# Patient Record
Sex: FEMALE | Race: WHITE | Employment: OTHER | URBAN - NONMETROPOLITAN AREA
[De-identification: names, ages, dates, MRNs, and addresses within clinical notes are randomized per-mention and may not be internally consistent; named-entity substitution may affect disease eponyms.]

---

## 2018-02-27 ENCOUNTER — APPOINTMENT (OUTPATIENT)
Dept: ANESTHESIOLOGY | Facility: HOSPITAL | Age: 68
End: 2018-02-27
Attending: OPHTHALMOLOGY
Payer: COMMERCIAL

## 2018-02-27 PROCEDURE — 00142 ANES PX ON EYE LENS SURGERY: CPT | Mod: QZ | Performed by: NURSE ANESTHETIST, CERTIFIED REGISTERED

## 2018-03-08 ENCOUNTER — APPOINTMENT (OUTPATIENT)
Dept: ANESTHESIOLOGY | Facility: HOSPITAL | Age: 68
End: 2018-03-08
Attending: OPHTHALMOLOGY
Payer: COMMERCIAL

## 2018-03-08 PROCEDURE — 00142 ANES PX ON EYE LENS SURGERY: CPT | Mod: QZ | Performed by: NURSE ANESTHETIST, CERTIFIED REGISTERED

## 2019-12-10 ENCOUNTER — OFFICE VISIT (OUTPATIENT)
Dept: SURGERY | Facility: OTHER | Age: 69
End: 2019-12-10
Attending: SURGERY
Payer: COMMERCIAL

## 2019-12-10 ENCOUNTER — PREP FOR PROCEDURE (OUTPATIENT)
Dept: SURGERY | Facility: OTHER | Age: 69
End: 2019-12-10

## 2019-12-10 VITALS
BODY MASS INDEX: 41.37 KG/M2 | HEIGHT: 68 IN | WEIGHT: 273 LBS | DIASTOLIC BLOOD PRESSURE: 72 MMHG | SYSTOLIC BLOOD PRESSURE: 128 MMHG | TEMPERATURE: 99.7 F

## 2019-12-10 DIAGNOSIS — K43.2 INCISIONAL HERNIA, WITHOUT OBSTRUCTION OR GANGRENE: Primary | ICD-10-CM

## 2019-12-10 DIAGNOSIS — E66.01 MORBID OBESITY (H): ICD-10-CM

## 2019-12-10 PROCEDURE — 99204 OFFICE O/P NEW MOD 45 MIN: CPT | Performed by: SURGERY

## 2019-12-10 PROCEDURE — G0463 HOSPITAL OUTPT CLINIC VISIT: HCPCS

## 2019-12-10 ASSESSMENT — MIFFLIN-ST. JEOR: SCORE: 1811.82

## 2019-12-10 ASSESSMENT — PAIN SCALES - GENERAL: PAINLEVEL: NO PAIN (0)

## 2019-12-10 NOTE — PROGRESS NOTES
CLINIC NOTE - CONSULT  12/10/2019    Patient: Odalys Ingram  Referring Physician: No ref. provider found    Reason for Referral: Ventral Hernia    This is a 69 year old female with a vental hernia.   Previous abdominal surgery: YES   Previous repair of this hernia:YES   Reducible : YES   Symptomatic : YES   Pain at the hernia site : YES   Signs of obstruction    Nausea/VomitingNO    Diarrhea/ConstipationNO   Has Noticed it for several weeks      Past Medical History:  Past Medical History:   Diagnosis Date     Arthritis      Hypertension        Past Surgical History:  Past Surgical History:   Procedure Laterality Date     BREAST SURGERY      Mastectomy     BREAST SURGERY      Mastectomy     HERNIA REPAIR       HYSTERECTOMY         Family History History:  Family History   Problem Relation Age of Onset     Breast Cancer Mother      Hypertension Mother      Lipids Mother      Hypertension Father      Lipids Father      Heart Disease Maternal Grandmother         Disease     Breast Cancer Paternal Grandmother      Cancer Paternal Grandmother         Leukemia       History of Tobacco Use:  History   Smoking Status     Never Smoker   Smokeless Tobacco     Never Used       Current Medications:  Current Outpatient Medications   Medication Sig Dispense Refill     amitriptyline (ELAVIL) 50 MG tablet Take 50 mg by mouth At Bedtime. 1 tablet at night       ascorbic acid 500 MG TABS Take  by mouth. 1 tablet daily       aspirin 81 MG tablet Take 1 tablet by mouth daily. 1 tablet daily       cholecalciferol 1000 UNIT TABS Take  by mouth. Take 1 cap by mouth daily       doxycycline Monohydrate 50 MG CAPS Take  by mouth. 1 tablet daily       lisinopril-hydrochlorothiazide (PRINZIDE,ZESTORETIC) 10-12.5 MG per tablet Take 1 tablet by mouth daily. 1 tablet daily       metoprolol (LOPRESSOR) 50 MG tablet Take 50 mg by mouth 2 times daily. 1 tablet daily       omega-3 fatty acids 1200 MG capsule Take 1 capsule by mouth daily. 1 daily    "      Allergies:  Allergies   Allergen Reactions     Penicillins Rash       ROS:  Pertinent items are noted in HPI.  All other systems are negative.    PHYSICAL EXAM:     Vital signs: /72   Temp 99.7  F (37.6  C) (Tympanic)   Ht 1.727 m (5' 8\")   Wt 123.8 kg (273 lb)   BMI 41.51 kg/m     Weight: [unfilled]   BMI: Body mass index is 41.51 kg/m .   General: Normal, healthy, cooperative, in no acute distress, morbidly obese   Skin: no rashes, no ecchymoses, no petechiae   HEENT: PERRLA   Neck: supple   Lungs: clear to auscultation   CV: Regular rate and rhythm without murmer   Abdominal: Abdomen soft, non-tender. BS normal. No masses,  No organomegaly    Extremities: No cyanosis, clubbing or edema noted bilaterally in Upper and Lower Extremities   Neurological: without deficit      Hernia    Location: infraumbilical     Reducible: NO    Tender: mildly    Erythematous: NO      ASSESSMENT: 69 year old female with a recurrent ventral hernia located at the lower midline.  The hernia is symptomatic.  The hernia is incarcerated.  The hernia is not tender.    PLAN:  Discussed the options with the patient.  After discussion patients elects for surgical intervention and repair.  Will plan on a laparoscopic possible open recurrent ventral hernia repair.  The procedure was explained with its risk, benefits and alternatives.  Risks include but are not limited to recurrence, infections, damage to internal organs, need for additional procedures, reactions to anesthesia.  All of the patients questions were answered.  The patient consents to proceed with the plan above.  The procedure will be scheduled.    Pre-operative physical : YES      "

## 2019-12-10 NOTE — NURSING NOTE
"Chief Complaint   Patient presents with     Consult For     Possible Hernia Repair       Initial /72   Temp 99.7  F (37.6  C) (Tympanic)   Ht 1.727 m (5' 8\")   Wt 123.8 kg (273 lb)   BMI 41.51 kg/m   Estimated body mass index is 41.51 kg/m  as calculated from the following:    Height as of this encounter: 1.727 m (5' 8\").    Weight as of this encounter: 123.8 kg (273 lb).  Medication Reconciliation: complete  Shruti Perry LPN  "

## 2020-01-02 ENCOUNTER — TRANSFERRED RECORDS (OUTPATIENT)
Dept: HEALTH INFORMATION MANAGEMENT | Facility: HOSPITAL | Age: 70
End: 2020-01-02

## 2020-01-06 ENCOUNTER — ANESTHESIA EVENT (OUTPATIENT)
Dept: SURGERY | Facility: HOSPITAL | Age: 70
End: 2020-01-06
Payer: COMMERCIAL

## 2020-01-06 NOTE — ANESTHESIA PREPROCEDURE EVALUATION
Anesthesia Pre-Procedure Evaluation    Patient: Odalys Ingram   MRN: [id number] : 1950          Preoperative Diagnosis: Incisional hernia, without obstruction or gangrene [K43.2]    Procedure(s):  LAPAROSCOPIC POSSIBLE OPEN VENTRAL HERNIA REPAIR    Past Medical History:   Diagnosis Date     Arthritis      Hypertension      PONV (postoperative nausea and vomiting)      Past Surgical History:   Procedure Laterality Date     BREAST SURGERY      Mastectomy     BREAST SURGERY      Mastectomy     HERNIA REPAIR       HYSTERECTOMY         Anesthesia Evaluation     . Pt has had prior anesthetic. Type: General    History of anesthetic complications   - PONV        ROS/MED HX    ENT/Pulmonary:     (+)JENA risk factors hypertension, obese, , . .    Neurologic:  - neg neurologic ROS     Cardiovascular:     (+) hypertension-range: on beta blocker, ---. : . . . :. . Previous cardiac testing date:results:date: results:ECG reviewed date:20 results:SR with First degree AVB date: results:          METS/Exercise Tolerance:     Hematologic:     (+) Other Hematologic Disorder-thrombocytopenia      Musculoskeletal:   (+) arthritis,  other musculoskeletal- lumbar disc disorder, fibromyalgia      GI/Hepatic:     (+) GERD Other GI/Hepatic incisional hernia       Renal/Genitourinary:  - ROS Renal section negative       Endo:     (+) Obesity, .      Psychiatric:     (+) psychiatric history anxiety      Infectious Disease:         Malignancy:   (+) Malignancy History of Breast          Other:    - neg other ROS                      Physical Exam  Normal systems: cardiovascular, pulmonary and dental    Airway   Mallampati: II  TM distance: >3 FB  Neck ROM: full    Dental   (+) upper dentures    Cardiovascular   Rhythm and rate: regular and normal      Pulmonary    breath sounds clear to auscultation(+) decreased breath sounds               No results found for: WBC, HGB, HCT, PLT, CRP, SED, NA, POTASSIUM, CHLORIDE, CO2, BUN, CR, GLC,  "DANAY, PHOS, MAG, ALBUMIN, PROTTOTAL, ALT, AST, GGT, ALKPHOS, BILITOTAL, BILIDIRECT, LIPASE, AMYLASE, LORENZO, PTT, INR, FIBR, TSH, T4, T3, HCG, HCGS, CKTOTAL, CKMB, TROPN    Preop Vitals  BP Readings from Last 3 Encounters:   12/10/19 128/72   04/30/12 124/76    Pulse Readings from Last 3 Encounters:   04/30/12 84      Resp Readings from Last 3 Encounters:   No data found for Resp    SpO2 Readings from Last 3 Encounters:   No data found for SpO2      Temp Readings from Last 1 Encounters:   12/10/19 99.7  F (37.6  C) (Tympanic)    Ht Readings from Last 1 Encounters:   12/10/19 1.727 m (5' 8\")      Wt Readings from Last 1 Encounters:   12/10/19 123.8 kg (273 lb)    Estimated body mass index is 41.51 kg/m  as calculated from the following:    Height as of 12/10/19: 1.727 m (5' 8\").    Weight as of 12/10/19: 123.8 kg (273 lb).       Anesthesia Plan      History & Physical Review  History and physical reviewed and following examination; no interval change.    ASA Status:  3 .    NPO Status:  > 2 hours    Plan for General, ETT, Peripheral Nerve Block and For Post-op pain in coordination with surgeon with Intravenous and Propofol induction. Maintenance will be Inhalation.    PONV prophylaxis:  Ondansetron (or other 5HT-3) and Dexamethasone or Solumedrol  H and P date 1/2/20      Postoperative Care  Postoperative pain management:  IV analgesics.      Consents  Anesthetic plan, risks, benefits and alternatives discussed with:  Patient..                 Hai Snyder, APRN CRNA  "

## 2020-01-09 ENCOUNTER — HOSPITAL ENCOUNTER (OUTPATIENT)
Facility: HOSPITAL | Age: 70
Discharge: HOME | End: 2020-01-09
Attending: SURGERY | Admitting: SURGERY
Payer: COMMERCIAL

## 2020-01-09 ENCOUNTER — ANESTHESIA (OUTPATIENT)
Dept: SURGERY | Facility: HOSPITAL | Age: 70
End: 2020-01-09
Payer: COMMERCIAL

## 2020-01-09 VITALS
RESPIRATION RATE: 20 BRPM | DIASTOLIC BLOOD PRESSURE: 86 MMHG | HEART RATE: 77 BPM | OXYGEN SATURATION: 94 % | SYSTOLIC BLOOD PRESSURE: 144 MMHG | TEMPERATURE: 98.2 F

## 2020-01-09 DIAGNOSIS — K43.2 INCISIONAL HERNIA, WITHOUT OBSTRUCTION OR GANGRENE: ICD-10-CM

## 2020-01-09 PROCEDURE — 25000125 ZZHC RX 250: Performed by: NURSE ANESTHETIST, CERTIFIED REGISTERED

## 2020-01-09 PROCEDURE — 49652 AS LAP VENT/ABD HERNIA REPAIR: CPT | Performed by: NURSE ANESTHETIST, CERTIFIED REGISTERED

## 2020-01-09 PROCEDURE — 40000305 ZZH STATISTIC PRE PROC ASSESS I: Performed by: SURGERY

## 2020-01-09 PROCEDURE — 25000128 H RX IP 250 OP 636: Performed by: SURGERY

## 2020-01-09 PROCEDURE — 27110028 ZZH OR GENERAL SUPPLY NON-STERILE: Performed by: SURGERY

## 2020-01-09 PROCEDURE — 27210794 ZZH OR GENERAL SUPPLY STERILE: Performed by: SURGERY

## 2020-01-09 PROCEDURE — 37000008 ZZH ANESTHESIA TECHNICAL FEE, 1ST 30 MIN: Performed by: SURGERY

## 2020-01-09 PROCEDURE — 71000014 ZZH RECOVERY PHASE 1 LEVEL 2 FIRST HR: Performed by: SURGERY

## 2020-01-09 PROCEDURE — 36000056 ZZH SURGERY LEVEL 3 1ST 30 MIN: Performed by: SURGERY

## 2020-01-09 PROCEDURE — 25800030 ZZH RX IP 258 OP 636: Performed by: NURSE ANESTHETIST, CERTIFIED REGISTERED

## 2020-01-09 PROCEDURE — 36000058 ZZH SURGERY LEVEL 3 EA 15 ADDTL MIN: Performed by: SURGERY

## 2020-01-09 PROCEDURE — 37000009 ZZH ANESTHESIA TECHNICAL FEE, EACH ADDTL 15 MIN: Performed by: SURGERY

## 2020-01-09 PROCEDURE — 71000027 ZZH RECOVERY PHASE 2 EACH 15 MINS: Performed by: SURGERY

## 2020-01-09 PROCEDURE — 25000132 ZZH RX MED GY IP 250 OP 250 PS 637: Performed by: NURSE ANESTHETIST, CERTIFIED REGISTERED

## 2020-01-09 PROCEDURE — 25000128 H RX IP 250 OP 636: Performed by: NURSE ANESTHETIST, CERTIFIED REGISTERED

## 2020-01-09 PROCEDURE — 25000566 ZZH SEVOFLURANE, EA 15 MIN: Performed by: NURSE ANESTHETIST, CERTIFIED REGISTERED

## 2020-01-09 RX ORDER — METOPROLOL TARTRATE 50 MG
50 TABLET ORAL ONCE
Status: COMPLETED | OUTPATIENT
Start: 2020-01-09 | End: 2020-01-09

## 2020-01-09 RX ORDER — KETAMINE HYDROCHLORIDE 10 MG/ML
INJECTION INTRAMUSCULAR; INTRAVENOUS PRN
Status: DISCONTINUED | OUTPATIENT
Start: 2020-01-09 | End: 2020-01-09

## 2020-01-09 RX ORDER — FENTANYL CITRATE 50 UG/ML
25-50 INJECTION, SOLUTION INTRAMUSCULAR; INTRAVENOUS
Status: DISCONTINUED | OUTPATIENT
Start: 2020-01-09 | End: 2020-01-09 | Stop reason: HOSPADM

## 2020-01-09 RX ORDER — SCOLOPAMINE TRANSDERMAL SYSTEM 1 MG/1
1 PATCH, EXTENDED RELEASE TRANSDERMAL
Status: DISCONTINUED | OUTPATIENT
Start: 2020-01-09 | End: 2020-01-09 | Stop reason: HOSPADM

## 2020-01-09 RX ORDER — FENTANYL CITRATE 50 UG/ML
INJECTION, SOLUTION INTRAMUSCULAR; INTRAVENOUS PRN
Status: DISCONTINUED | OUTPATIENT
Start: 2020-01-09 | End: 2020-01-09

## 2020-01-09 RX ORDER — SODIUM CHLORIDE, SODIUM LACTATE, POTASSIUM CHLORIDE, CALCIUM CHLORIDE 600; 310; 30; 20 MG/100ML; MG/100ML; MG/100ML; MG/100ML
INJECTION, SOLUTION INTRAVENOUS CONTINUOUS PRN
Status: DISCONTINUED | OUTPATIENT
Start: 2020-01-09 | End: 2020-01-09

## 2020-01-09 RX ORDER — SODIUM CHLORIDE, SODIUM LACTATE, POTASSIUM CHLORIDE, CALCIUM CHLORIDE 600; 310; 30; 20 MG/100ML; MG/100ML; MG/100ML; MG/100ML
INJECTION, SOLUTION INTRAVENOUS CONTINUOUS
Status: DISCONTINUED | OUTPATIENT
Start: 2020-01-09 | End: 2020-01-09 | Stop reason: HOSPADM

## 2020-01-09 RX ORDER — ONDANSETRON 2 MG/ML
4 INJECTION INTRAMUSCULAR; INTRAVENOUS EVERY 30 MIN PRN
Status: DISCONTINUED | OUTPATIENT
Start: 2020-01-09 | End: 2020-01-09 | Stop reason: HOSPADM

## 2020-01-09 RX ORDER — ONDANSETRON 4 MG/1
4 TABLET, ORALLY DISINTEGRATING ORAL EVERY 30 MIN PRN
Status: DISCONTINUED | OUTPATIENT
Start: 2020-01-09 | End: 2020-01-09 | Stop reason: HOSPADM

## 2020-01-09 RX ORDER — CIPROFLOXACIN 2 MG/ML
400 INJECTION, SOLUTION INTRAVENOUS SEE ADMIN INSTRUCTIONS
Status: DISCONTINUED | OUTPATIENT
Start: 2020-01-09 | End: 2020-01-09 | Stop reason: HOSPADM

## 2020-01-09 RX ORDER — MEPERIDINE HYDROCHLORIDE 50 MG/ML
12.5 INJECTION INTRAMUSCULAR; INTRAVENOUS; SUBCUTANEOUS
Status: DISCONTINUED | OUTPATIENT
Start: 2020-01-09 | End: 2020-01-09 | Stop reason: HOSPADM

## 2020-01-09 RX ORDER — LIDOCAINE 40 MG/G
CREAM TOPICAL
Status: DISCONTINUED | OUTPATIENT
Start: 2020-01-09 | End: 2020-01-09 | Stop reason: HOSPADM

## 2020-01-09 RX ORDER — NALOXONE HYDROCHLORIDE 0.4 MG/ML
.1-.4 INJECTION, SOLUTION INTRAMUSCULAR; INTRAVENOUS; SUBCUTANEOUS
Status: DISCONTINUED | OUTPATIENT
Start: 2020-01-09 | End: 2020-01-09 | Stop reason: HOSPADM

## 2020-01-09 RX ORDER — DEXAMETHASONE SODIUM PHOSPHATE 10 MG/ML
INJECTION, SOLUTION INTRAMUSCULAR; INTRAVENOUS PRN
Status: DISCONTINUED | OUTPATIENT
Start: 2020-01-09 | End: 2020-01-09

## 2020-01-09 RX ORDER — ALBUTEROL SULFATE 5 MG/ML
2.5 SOLUTION RESPIRATORY (INHALATION)
Status: DISCONTINUED | OUTPATIENT
Start: 2020-01-09 | End: 2020-01-09 | Stop reason: HOSPADM

## 2020-01-09 RX ORDER — PROPOFOL 10 MG/ML
INJECTION, EMULSION INTRAVENOUS PRN
Status: DISCONTINUED | OUTPATIENT
Start: 2020-01-09 | End: 2020-01-09

## 2020-01-09 RX ORDER — CIPROFLOXACIN 2 MG/ML
400 INJECTION, SOLUTION INTRAVENOUS
Status: COMPLETED | OUTPATIENT
Start: 2020-01-09 | End: 2020-01-09

## 2020-01-09 RX ORDER — LIDOCAINE HYDROCHLORIDE 20 MG/ML
INJECTION, SOLUTION INFILTRATION; PERINEURAL PRN
Status: DISCONTINUED | OUTPATIENT
Start: 2020-01-09 | End: 2020-01-09

## 2020-01-09 RX ADMIN — FENTANYL CITRATE 50 MCG: 50 INJECTION, SOLUTION INTRAMUSCULAR; INTRAVENOUS at 10:11

## 2020-01-09 RX ADMIN — FENTANYL CITRATE 50 MCG: 50 INJECTION, SOLUTION INTRAMUSCULAR; INTRAVENOUS at 10:02

## 2020-01-09 RX ADMIN — METOPROLOL TARTRATE 50 MG: 50 TABLET, FILM COATED ORAL at 09:01

## 2020-01-09 RX ADMIN — MIDAZOLAM HYDROCHLORIDE 2 MG: 1 INJECTION, SOLUTION INTRAMUSCULAR; INTRAVENOUS at 09:25

## 2020-01-09 RX ADMIN — SCOPALAMINE 1 PATCH: 1 PATCH, EXTENDED RELEASE TRANSDERMAL at 09:25

## 2020-01-09 RX ADMIN — SODIUM CHLORIDE, POTASSIUM CHLORIDE, SODIUM LACTATE AND CALCIUM CHLORIDE: 600; 310; 30; 20 INJECTION, SOLUTION INTRAVENOUS at 09:10

## 2020-01-09 RX ADMIN — DEXAMETHASONE SODIUM PHOSPHATE 10 MG: 10 INJECTION, SOLUTION INTRAMUSCULAR; INTRAVENOUS at 10:30

## 2020-01-09 RX ADMIN — Medication 100 MG: at 10:20

## 2020-01-09 RX ADMIN — Medication 160 MG: at 10:11

## 2020-01-09 RX ADMIN — ROCURONIUM BROMIDE 10 MG: 10 INJECTION INTRAVENOUS at 10:11

## 2020-01-09 RX ADMIN — Medication 10 MG: at 10:11

## 2020-01-09 RX ADMIN — CIPROFLOXACIN 400 MG: 2 INJECTION, SOLUTION INTRAVENOUS at 09:37

## 2020-01-09 RX ADMIN — PROPOFOL 200 MG: 10 INJECTION, EMULSION INTRAVENOUS at 10:11

## 2020-01-09 RX ADMIN — PROPOFOL 200 MG: 10 INJECTION, EMULSION INTRAVENOUS at 10:20

## 2020-01-09 RX ADMIN — Medication 10 MG: at 10:06

## 2020-01-09 RX ADMIN — PROPOFOL 100 MG: 10 INJECTION, EMULSION INTRAVENOUS at 10:15

## 2020-01-09 RX ADMIN — LIDOCAINE HYDROCHLORIDE 40 MG: 20 INJECTION, SOLUTION INFILTRATION; PERINEURAL at 10:11

## 2020-01-09 NOTE — PROGRESS NOTES
The planned procedure was canceled.  This was secondary to the inability to intubate the patient.  Please see anesthesia note respect to the difficulty with intubation.  We will have the patient see ENT for upper airway evaluation.  Once her upper airway is assessed will make a determination whether or not she can be done at this facility.  If it is safe to do it in this facility then we will reschedule her.  When we will reschedule her anesthesia will be aware for any additional equipment or techniques that they need.

## 2020-01-09 NOTE — ANESTHESIA POSTPROCEDURE EVALUATION
Patient: Odalys Ingram    Procedure(s):  LAPAROSCOPIC POSSIBLE OPEN VENTRAL HERNIA REPAIR    Diagnosis:Incisional hernia, without obstruction or gangrene [K43.2]  Diagnosis Additional Information: No value filed.    Anesthesia Type:  General, ETT, Peripheral Nerve Block, For Post-op pain in coordination with surgeon    Note:  Anesthesia Post Evaluation    Patient location during evaluation: Phase 2  Patient participation: Able to fully participate in evaluation  Level of consciousness: awake and alert  Pain management: adequate  Airway patency: patent  Cardiovascular status: acceptable  Respiratory status: acceptable  Hydration status: acceptable  PONV: none             Last vitals:  Vitals:    01/09/20 1135 01/09/20 1151 01/09/20 1200   BP: 133/72 132/74 136/77   Pulse: 76 75 73   Resp: 21 20 20   Temp: 98.2  F (36.8  C)     SpO2: 93% (!) 91% (!) 90%         Electronically Signed By: LAURITA Doyle CRNA  January 9, 2020  12:11 PM

## 2020-01-09 NOTE — DISCHARGE INSTRUCTIONS
Remove the scopolamine patch behind your left  ear after 24 hours after application.   After removing the patch, wash your hands and the area behind your ear thoroughly with soap and water.   The patch will still contain some medicine after use.   To avoid accidental contact or ingestion by children or pets, fold the used patch in half with the sticky side together and throw away in the trash out of the reach of children and pets.         Post-Anesthesia Patient Instructions    IMMEDIATELY FOLLOWING SURGERY:  Do not drive or operate machinery for the first twenty four hours after surgery.  Do not make any important decisions for twenty four hours after surgery or while taking narcotic pain medications or sedatives.  If you develop intractable nausea and vomiting or a severe headache please notify your doctor immediately.    FOLLOW-UP:  Please make an appointment with your surgeon as instructed. You do not need to follow up with anesthesia unless specifically instructed to do so.    WOUND CARE INSTRUCTIONS (if applicable):  Keep a dry clean dressing on the anesthesia/puncture wound site if there is drainage.  Once the wound has quit draining you may leave it open to air.  Generally you should leave the bandage intact for twenty four hours unless there is drainage.  If the epidural site drains for more than 36-48 hours please call the anesthesia department.    QUESTIONS?:  Please feel free to call your physician or the hospital  if you have any questions, and they will be happy to assist you.                                                                                                                                                                                                                                                                                                                                 Follow up with  at [facility] lower level for UPPER AIRWAY EVALUATION.TUESDAY  February 4th at 3:30pm.

## 2020-01-09 NOTE — OR NURSING
Throat scratchy.Jasvir Burrell in to speak with pt.Patient and responsible adult given discharge instructions with no questions regarding instructions. Alirio score 19. Pain level 3/10.  Discharged from unit via w/c. Patient discharged to home with   .Up w/o vertigo.

## 2020-01-09 NOTE — ANESTHESIA CARE TRANSFER NOTE
Patient: Odalys Ingram    Procedure(s):  LAPAROSCOPIC POSSIBLE OPEN VENTRAL HERNIA REPAIR    Diagnosis: Incisional hernia, without obstruction or gangrene [K43.2]  Diagnosis Additional Information: No value filed.    Anesthesia Type:   General, ETT, Peripheral Nerve Block, For Post-op pain in coordination with surgeon     Note:  Airway :Face Mask  Patient transferred to:PACU  Handoff Report: Identifed the Patient, Identified the Reponsible Provider, Reviewed the pertinent medical history, Discussed the surgical course, Reviewed Intra-OP anesthesia mangement and issues during anesthesia, Set expectations for post-procedure period and Allowed opportunity for questions and acknowledgement of understanding      Vitals: (Last set prior to Anesthesia Care Transfer)    CRNA VITALS  1/9/2020 1024 - 1/9/2020 1100      1/9/2020             Resp Rate (set):  8                Electronically Signed By: LAURITA Clifford CRNA  January 9, 2020  11:00 AM

## 2020-02-04 ENCOUNTER — TRANSFERRED RECORDS (OUTPATIENT)
Dept: HEALTH INFORMATION MANAGEMENT | Facility: CLINIC | Age: 70
End: 2020-02-04

## 2020-02-05 ENCOUNTER — TRANSFERRED RECORDS (OUTPATIENT)
Dept: HEALTH INFORMATION MANAGEMENT | Facility: CLINIC | Age: 70
End: 2020-02-05

## 2020-02-10 ENCOUNTER — OFFICE VISIT (OUTPATIENT)
Dept: SURGERY | Facility: OTHER | Age: 70
End: 2020-02-10
Attending: SURGERY
Payer: COMMERCIAL

## 2020-02-10 ENCOUNTER — PREP FOR PROCEDURE (OUTPATIENT)
Dept: SURGERY | Facility: OTHER | Age: 70
End: 2020-02-10

## 2020-02-10 VITALS
TEMPERATURE: 98.9 F | DIASTOLIC BLOOD PRESSURE: 68 MMHG | HEIGHT: 68 IN | WEIGHT: 273 LBS | HEART RATE: 79 BPM | BODY MASS INDEX: 41.37 KG/M2 | SYSTOLIC BLOOD PRESSURE: 126 MMHG | OXYGEN SATURATION: 96 %

## 2020-02-10 DIAGNOSIS — T88.4XXA: ICD-10-CM

## 2020-02-10 DIAGNOSIS — K43.9 VENTRAL HERNIA WITHOUT OBSTRUCTION OR GANGRENE: Primary | ICD-10-CM

## 2020-02-10 DIAGNOSIS — K43.2 INCISIONAL HERNIA, WITHOUT OBSTRUCTION OR GANGRENE: Primary | ICD-10-CM

## 2020-02-10 PROCEDURE — G0463 HOSPITAL OUTPT CLINIC VISIT: HCPCS

## 2020-02-10 PROCEDURE — 99213 OFFICE O/P EST LOW 20 MIN: CPT | Performed by: SURGERY

## 2020-02-10 ASSESSMENT — PAIN SCALES - GENERAL: PAINLEVEL: NO PAIN (0)

## 2020-02-10 ASSESSMENT — MIFFLIN-ST. JEOR: SCORE: 1811.82

## 2020-02-10 NOTE — PROGRESS NOTES
"CLINIC NOTE - FOLLOW UP  2/10/2020    Patient:Odalys Ingram    Reason for Visit: Follow up from prior clinic visit for ventral hernia    This is a 69 year old female here for follow up from a prior clinic visit for a ventral hernia. Her prior medical records were reviewed.       Prior to this she was taken to the operating room for repair of the ventral hernia.  At the time of surgery she was unable to be intubated and the procedure was halted.  She was scheduled to see Dr. Gordon of ENT to evaluate her airway.  Do not have his note today.  She is here for scheduling of surgery.    Current Medications:  Current Outpatient Medications   Medication Sig Dispense Refill     amitriptyline (ELAVIL) 50 MG tablet Take 50 mg by mouth At Bedtime. 1 tablet at night       ascorbic acid 500 MG TABS Take  by mouth. 1 tablet daily       aspirin 81 MG tablet Take 1 tablet by mouth daily. 1 tablet daily       cholecalciferol 1000 UNIT TABS Take  by mouth. Take 1 cap by mouth daily       lisinopril-hydrochlorothiazide (PRINZIDE,ZESTORETIC) 10-12.5 MG per tablet Take 1 tablet by mouth daily. 1 tablet daily       metoprolol (LOPRESSOR) 50 MG tablet Take 50 mg by mouth 2 times daily. 1 tablet daily       omega-3 fatty acids 1200 MG capsule Take 1 capsule by mouth daily. 1 daily         Allergies:  No Known Allergies    ROS:  Pertinent items are noted in HPI.  All other systems are negative.    PHYSICAL EXAM:     Vital signs: /68 (BP Location: Right arm, Patient Position: Chair, Cuff Size: Adult Large)   Pulse 79   Temp 98.9  F (37.2  C) (Tympanic)   Ht 1.727 m (5' 8\")   Wt 123.8 kg (273 lb)   SpO2 96%   BMI 41.51 kg/m     Weight: [unfilled]   BMI: Body mass index is 41.51 kg/m .   General: Normal, healthy, cooperative, in no acute distress, alert, morbidly obese   Skin: no jaundice   HEENT: PERRLA and EOMI   Neck: supple   Lungs: clear to auscultation   CV: Regular rate and rhythm without murmer   Abdominal: abdomen is soft " without significant tenderness, masses, organomegaly or guarding.  There is an infraumbilical reducible hernia.   Extremities: No cyanosis, clubbing or edema noted bilaterally in Upper and Lower Extremities   Neurological: without deficit    ASSESSMENT:  69 year old female here as follow up from a prior clinic visit for a ventral hernia.    PLAN: Ahead and reschedule her surgery.  We will plan on doing a laparoscopic ventral hernia repair.  We will get the note from Dr. Gordon for my review and to have that available for anesthesiology.    We will use her prior history and physical.    The risks, benefits, and alternatives to the planned procedure were fully discussed with the patient and/or the patient's representative(s). The risks of bleeding, infection, death, missing pathology, the need for additional procedures intra-operatively, the possible need for intra-operative consults, the possible need for transfusion therapy, cardiopulmonary compromise, the possible need for additional surgery for a complication were discussed with the patient and/or the patient's representative(s). The patient's and/or patient's representative(s) questions were addressed and answered. Informed consent was obtained from the patient and/or the patient's representative(s). The patient and/or the patient's representative(s) consent to proceed.

## 2020-02-10 NOTE — PATIENT INSTRUCTIONS
Thank you for allowing our surgical team to participate in your care. Please review the instructions below.   If you have questions, you may contact us at the any of the following numbers:     [facility] Health Unit Coordinator: [phone]  Clinic Surgery Nurse ([place]): [phone]  [facility] Surgery Education Nurse: [phone]    You are scheduled for: Laparascopic Ventral Hernia Repair with Dr. Pedro on February 2nd, 2020  Admit Time: Hospital Surgery will call you the day before your procedure by 5pm with your arrival time.   If your surgery is on Monday, expect a call on Friday.   If you are not contacted before 5PM, please call admitting at [phone].   After hours or on weekends, please call [phone] to postpone.     Call the clinic surgery nurse if you become ill within 1-2 weeks of your procedure to reschedule.   This includes fever, chills, sore throat, cough, chest congestion, runny nose, or any other symptom of any other illness.     No pre op needed- pre op already done        Please call the Hospital Surgery Education Nurse at [phone] 1-2 weeks prior to your procedure and have a medication list ready.     Do not take Aspirin or other NSAIDS (Ibuprofen, Motrin, Aleve, Celebrex, Naproxen, etc), vitamins, and supplements 7 days before your surgery unless you have been otherwise directed. If you are prescribed a daily 81mg Aspirin, you may continue this.   If you are prescribed blood thinners or insulin, please contact your primary care provider for instructions.    Shower the night before and the morning of your procedure with Hibiclens soap.  On The Night Before Your Surgery:  1.  Remove your jewelry and leave off until after surgery. Wash your hair and body with your regular soap/shampoo. Use a freshly washed washcloth. Include cleaning inside your belly button. Rinse off soap/shampoo.  2. Wash your body from neck to feet using 1/2 of the 1st Hibiclens (Chlorhexidine) bottle with your bare  hands. Do not use the Hibiclens on your face, hair or genital area. Leave the soap on your body for one minute and rinse.  3. Repeat step 2 with the 2nd half of the bottle.  4. Rinse off all soap and dry with a freshly washed towel. Do not use lotions or hair products.  5. Sleep in freshly washed pajamas and freshly washed bedding.  On The Morning of Your Surgery:  1.Wash your hair and body with your regular soap/shampoo. Use another freshly washed washcloth. Rinse off.   2. Wash your body from neck to feet using 1/2 of the 2nd Hibiclens (Chlorhexidine) bottle with your bare hands. Leave the soap on your body for one minute and rinse.  3. Repeat step 2 with the 2nd half of the bottle.  4. Rinse off all the soap and dry with another freshly washed towel. Do not use lotions or hair products.  5. Wear freshly washed clothing to the hospital.    Do not have anything to eat or drink after midnight the night before your surgery (or 8 hours prior to surgery), except clear liquids (water, clear juice, clear broth, plain coffee or tea without cream or milk) up until 2 hours prior to arrival time.   Nothing by mouth after the 2 hours prior to arrival.  Do not chew gum, suck on hard candy, or smoke. You can brush your teeth.   If you are directed to take any medications, take them with a tiny sip of water.   If you use an inhaler, bring it with you.    Arrive in clean, comfortable clothing.   Do not wear any jewelry, make-up, nail polish, lotions, hair products, or perfumes.   [place] in hospital admitting through the [facility].    A responsible adult must be available to drive you home and stay with you for 24 hours at home. If you need to take a taxi or the bus, you must have another responsible adult to ride with you. Your procedure will be cancelled if you do not have a responsible adult .     Return to clinic for a postop appointment 2 week(s) from your surgery date.

## 2020-02-10 NOTE — NURSING NOTE
"Chief Complaint   Patient presents with     RECHECK     surgery was cancelled- her to schedule surgery        Initial /68 (BP Location: Right arm, Patient Position: Chair, Cuff Size: Adult Large)   Pulse 79   Temp 98.9  F (37.2  C) (Tympanic)   Ht 1.727 m (5' 8\")   Wt 123.8 kg (273 lb)   SpO2 96%   BMI 41.51 kg/m   Estimated body mass index is 41.51 kg/m  as calculated from the following:    Height as of this encounter: 1.727 m (5' 8\").    Weight as of this encounter: 123.8 kg (273 lb).  Medication Reconciliation: complete  Destini Lewis LPN  "

## 2020-02-21 ENCOUNTER — ANESTHESIA EVENT (OUTPATIENT)
Dept: SURGERY | Facility: HOSPITAL | Age: 70
End: 2020-02-21
Payer: COMMERCIAL

## 2020-02-21 NOTE — ANESTHESIA PREPROCEDURE EVALUATION
Anesthesia Pre-Procedure Evaluation    Patient: Odalys Ingrma   MRN: [id number] : 1950          Preoperative Diagnosis: Incisional hernia, without obstruction or gangrene [K43.2]    Procedure(s):  LAPAROSCOPIC VENTRAL HERNIA REPAIR    Past Medical History:   Diagnosis Date     Anticipated difficulty with intubation 2020     Arthritis      Hypertension      PONV (postoperative nausea and vomiting)      Past Surgical History:   Procedure Laterality Date     BREAST SURGERY      Mastectomy     BREAST SURGERY      Mastectomy     HERNIA REPAIR       HYSTERECTOMY       LAPAROSCOPIC HERNIORRHAPHY VENTRAL N/A 2020    Procedure: LAPAROSCOPIC POSSIBLE OPEN VENTRAL HERNIA REPAIR;  Surgeon: Marvin Pedro MD;  Location: [facility]       Anesthesia Evaluation     . Pt has had prior anesthetic. Type: General    History of anesthetic complications   - difficult intubation and PONV        ROS/MED HX    ENT/Pulmonary:     (+)JENA risk factors hypertension, obese, , . .    Neurologic:  - neg neurologic ROS     Cardiovascular:     (+) hypertension-range: on beta blocker, ---. : . . . :. . Previous cardiac testing date:results:date: results:ECG reviewed date:20 results:SR with First degree AVB date: results:          METS/Exercise Tolerance:  4 - Raking leaves, gardening   Hematologic:     (+) Other Hematologic Disorder-thrombocytopenia      Musculoskeletal:   (+) arthritis,  other musculoskeletal- lumbar disc disorder, fibromyalgia      GI/Hepatic:     (+) GERD Asymptomatic on medication, Other GI/Hepatic incisional hernia       Renal/Genitourinary:  - ROS Renal section negative       Endo:     (+) Obesity, .      Psychiatric:     (+) psychiatric history anxiety      Infectious Disease:  - neg infectious disease ROS       Malignancy:   (+) Malignancy History of Breast          Other:    - neg other ROS                      Physical Exam  Normal systems: cardiovascular and pulmonary    Airway   Mallampati:  "IV  TM distance: <3 FB  Neck ROM: full    Dental   (+) upper dentures    Cardiovascular   Rhythm and rate: regular and normal      Pulmonary    breath sounds clear to auscultation            No results found for: WBC, HGB, HCT, PLT, CRP, SED, NA, POTASSIUM, CHLORIDE, CO2, BUN, CR, GLC, DANAY, PHOS, MAG, ALBUMIN, PROTTOTAL, ALT, AST, GGT, ALKPHOS, BILITOTAL, BILIDIRECT, LIPASE, AMYLASE, LORENZO, PTT, INR, FIBR, TSH, T4, T3, HCG, HCGS, CKTOTAL, CKMB, TROPN    Preop Vitals  BP Readings from Last 3 Encounters:   02/10/20 126/68   01/09/20 144/86   12/10/19 128/72    Pulse Readings from Last 3 Encounters:   02/10/20 79   01/09/20 77   04/30/12 84      Resp Readings from Last 3 Encounters:   01/09/20 20    SpO2 Readings from Last 3 Encounters:   02/10/20 96%   01/09/20 94%      Temp Readings from Last 1 Encounters:   02/10/20 98.9  F (37.2  C) (Tympanic)    Ht Readings from Last 1 Encounters:   02/10/20 1.727 m (5' 8\")      Wt Readings from Last 1 Encounters:   02/10/20 123.8 kg (273 lb)    Estimated body mass index is 41.51 kg/m  as calculated from the following:    Height as of 2/10/20: 1.727 m (5' 8\").    Weight as of 2/10/20: 123.8 kg (273 lb).       Anesthesia Plan      History & Physical Review  History and physical reviewed and following examination; no interval change.    ASA Status:  3 .    NPO Status:  > 8 hours    Plan for General and ETT with Intravenous and Propofol induction.     Additional equipment: Videolaryngoscope Consult note on 2/10/20 states to use prior h/p which was dated 1/2/20. Consult note does document hear and lungs but does not provide past medical history   Anticipate difficult airway-was cancelled with last surgery due to inability to intubate       Postoperative Care  Postoperative pain management:  IV analgesics.      Consents  Anesthetic plan, risks, benefits and alternatives discussed with:  Patient..                 LAURITA Pacheco CRNA  "

## 2020-02-27 ENCOUNTER — ANESTHESIA (OUTPATIENT)
Dept: SURGERY | Facility: HOSPITAL | Age: 70
End: 2020-02-27
Payer: COMMERCIAL

## 2020-02-27 ENCOUNTER — HOSPITAL ENCOUNTER (OUTPATIENT)
Facility: HOSPITAL | Age: 70
Discharge: HOME | End: 2020-02-27
Attending: SURGERY | Admitting: SURGERY
Payer: COMMERCIAL

## 2020-02-27 VITALS
RESPIRATION RATE: 16 BRPM | TEMPERATURE: 97 F | DIASTOLIC BLOOD PRESSURE: 82 MMHG | WEIGHT: 273 LBS | OXYGEN SATURATION: 91 % | BODY MASS INDEX: 41.37 KG/M2 | HEART RATE: 77 BPM | SYSTOLIC BLOOD PRESSURE: 145 MMHG | HEIGHT: 68 IN

## 2020-02-27 DIAGNOSIS — K43.2 INCISIONAL HERNIA, WITHOUT OBSTRUCTION OR GANGRENE: ICD-10-CM

## 2020-02-27 PROCEDURE — 71000014 ZZH RECOVERY PHASE 1 LEVEL 2 FIRST HR: Performed by: SURGERY

## 2020-02-27 PROCEDURE — 25000128 H RX IP 250 OP 636: Performed by: NURSE ANESTHETIST, CERTIFIED REGISTERED

## 2020-02-27 PROCEDURE — 25000132 ZZH RX MED GY IP 250 OP 250 PS 637: Performed by: NURSE ANESTHETIST, CERTIFIED REGISTERED

## 2020-02-27 PROCEDURE — 71000027 ZZH RECOVERY PHASE 2 EACH 15 MINS: Performed by: SURGERY

## 2020-02-27 PROCEDURE — 49652 AS LAP VENT/ABD HERNIA REPAIR: CPT | Performed by: NURSE ANESTHETIST, CERTIFIED REGISTERED

## 2020-02-27 PROCEDURE — 25000125 ZZHC RX 250: Performed by: NURSE ANESTHETIST, CERTIFIED REGISTERED

## 2020-02-27 PROCEDURE — 36000093 ZZH SURGERY LEVEL 4 1ST 30 MIN: Performed by: SURGERY

## 2020-02-27 PROCEDURE — 27110028 ZZH OR GENERAL SUPPLY NON-STERILE: Performed by: SURGERY

## 2020-02-27 PROCEDURE — 27210794 ZZH OR GENERAL SUPPLY STERILE: Performed by: SURGERY

## 2020-02-27 PROCEDURE — 25000125 ZZHC RX 250: Performed by: SURGERY

## 2020-02-27 PROCEDURE — 25000128 H RX IP 250 OP 636: Performed by: SURGERY

## 2020-02-27 PROCEDURE — 40000305 ZZH STATISTIC PRE PROC ASSESS I: Performed by: SURGERY

## 2020-02-27 PROCEDURE — 36000063 ZZH SURGERY LEVEL 4 EA 15 ADDTL MIN: Performed by: SURGERY

## 2020-02-27 PROCEDURE — 37000009 ZZH ANESTHESIA TECHNICAL FEE, EACH ADDTL 15 MIN: Performed by: SURGERY

## 2020-02-27 PROCEDURE — 49657: CPT | Performed by: SURGERY

## 2020-02-27 PROCEDURE — 25800030 ZZH RX IP 258 OP 636: Performed by: NURSE ANESTHETIST, CERTIFIED REGISTERED

## 2020-02-27 PROCEDURE — 37000008 ZZH ANESTHESIA TECHNICAL FEE, 1ST 30 MIN: Performed by: SURGERY

## 2020-02-27 PROCEDURE — C1781 MESH (IMPLANTABLE): HCPCS | Performed by: SURGERY

## 2020-02-27 DEVICE — IMPLANTABLE DEVICE: Type: IMPLANTABLE DEVICE | Site: ABDOMEN | Status: FUNCTIONAL

## 2020-02-27 RX ORDER — MEPERIDINE HYDROCHLORIDE 50 MG/ML
12.5 INJECTION INTRAMUSCULAR; INTRAVENOUS; SUBCUTANEOUS
Status: DISCONTINUED | OUTPATIENT
Start: 2020-02-27 | End: 2020-02-27 | Stop reason: HOSPADM

## 2020-02-27 RX ORDER — HYDROMORPHONE HYDROCHLORIDE 1 MG/ML
.3-.5 INJECTION, SOLUTION INTRAMUSCULAR; INTRAVENOUS; SUBCUTANEOUS EVERY 10 MIN PRN
Status: DISCONTINUED | OUTPATIENT
Start: 2020-02-27 | End: 2020-02-27 | Stop reason: HOSPADM

## 2020-02-27 RX ORDER — NALOXONE HYDROCHLORIDE 0.4 MG/ML
.1-.4 INJECTION, SOLUTION INTRAMUSCULAR; INTRAVENOUS; SUBCUTANEOUS
Status: DISCONTINUED | OUTPATIENT
Start: 2020-02-27 | End: 2020-02-27 | Stop reason: HOSPADM

## 2020-02-27 RX ORDER — NEOSTIGMINE METHYLSULFATE 1 MG/ML
VIAL (ML) INJECTION PRN
Status: DISCONTINUED | OUTPATIENT
Start: 2020-02-27 | End: 2020-02-27

## 2020-02-27 RX ORDER — GLYCOPYRROLATE 0.2 MG/ML
INJECTION, SOLUTION INTRAMUSCULAR; INTRAVENOUS PRN
Status: DISCONTINUED | OUTPATIENT
Start: 2020-02-27 | End: 2020-02-27

## 2020-02-27 RX ORDER — HYDROCODONE BITARTRATE AND ACETAMINOPHEN 5; 325 MG/1; MG/1
1 TABLET ORAL EVERY 4 HOURS PRN
Qty: 18 TABLET | Refills: 0 | Status: SHIPPED | OUTPATIENT
Start: 2020-02-27 | End: 2020-03-16

## 2020-02-27 RX ORDER — SCOLOPAMINE TRANSDERMAL SYSTEM 1 MG/1
1 PATCH, EXTENDED RELEASE TRANSDERMAL ONCE
Status: DISCONTINUED | OUTPATIENT
Start: 2020-02-27 | End: 2020-02-27 | Stop reason: HOSPADM

## 2020-02-27 RX ORDER — ONDANSETRON 4 MG/1
4 TABLET, ORALLY DISINTEGRATING ORAL EVERY 30 MIN PRN
Status: DISCONTINUED | OUTPATIENT
Start: 2020-02-27 | End: 2020-02-27 | Stop reason: HOSPADM

## 2020-02-27 RX ORDER — DEXAMETHASONE SODIUM PHOSPHATE 10 MG/ML
INJECTION, SOLUTION INTRAMUSCULAR; INTRAVENOUS PRN
Status: DISCONTINUED | OUTPATIENT
Start: 2020-02-27 | End: 2020-02-27

## 2020-02-27 RX ORDER — LIDOCAINE 40 MG/G
CREAM TOPICAL
Status: DISCONTINUED | OUTPATIENT
Start: 2020-02-27 | End: 2020-02-27 | Stop reason: HOSPADM

## 2020-02-27 RX ORDER — PROPOFOL 10 MG/ML
INJECTION, EMULSION INTRAVENOUS PRN
Status: DISCONTINUED | OUTPATIENT
Start: 2020-02-27 | End: 2020-02-27

## 2020-02-27 RX ORDER — ONDANSETRON 2 MG/ML
4 INJECTION INTRAMUSCULAR; INTRAVENOUS EVERY 30 MIN PRN
Status: DISCONTINUED | OUTPATIENT
Start: 2020-02-27 | End: 2020-02-27 | Stop reason: HOSPADM

## 2020-02-27 RX ORDER — SODIUM CHLORIDE, SODIUM LACTATE, POTASSIUM CHLORIDE, CALCIUM CHLORIDE 600; 310; 30; 20 MG/100ML; MG/100ML; MG/100ML; MG/100ML
INJECTION, SOLUTION INTRAVENOUS CONTINUOUS
Status: DISCONTINUED | OUTPATIENT
Start: 2020-02-27 | End: 2020-02-27 | Stop reason: HOSPADM

## 2020-02-27 RX ORDER — CEFOTETAN AND DEXTROSE 2 G/50ML
2 INJECTION, SOLUTION INTRAVENOUS SEE ADMIN INSTRUCTIONS
Status: DISCONTINUED | OUTPATIENT
Start: 2020-02-27 | End: 2020-02-27 | Stop reason: HOSPADM

## 2020-02-27 RX ORDER — FENTANYL CITRATE 50 UG/ML
25-50 INJECTION, SOLUTION INTRAMUSCULAR; INTRAVENOUS
Status: DISCONTINUED | OUTPATIENT
Start: 2020-02-27 | End: 2020-02-27 | Stop reason: HOSPADM

## 2020-02-27 RX ORDER — FENTANYL CITRATE 50 UG/ML
INJECTION, SOLUTION INTRAMUSCULAR; INTRAVENOUS PRN
Status: DISCONTINUED | OUTPATIENT
Start: 2020-02-27 | End: 2020-02-27

## 2020-02-27 RX ORDER — ONDANSETRON 2 MG/ML
INJECTION INTRAMUSCULAR; INTRAVENOUS PRN
Status: DISCONTINUED | OUTPATIENT
Start: 2020-02-27 | End: 2020-02-27

## 2020-02-27 RX ORDER — CEFOTETAN AND DEXTROSE 2 G/50ML
2 INJECTION, SOLUTION INTRAVENOUS
Status: COMPLETED | OUTPATIENT
Start: 2020-02-27 | End: 2020-02-27

## 2020-02-27 RX ORDER — LIDOCAINE HYDROCHLORIDE 20 MG/ML
INJECTION, SOLUTION INFILTRATION; PERINEURAL PRN
Status: DISCONTINUED | OUTPATIENT
Start: 2020-02-27 | End: 2020-02-27

## 2020-02-27 RX ORDER — HYDROCODONE BITARTRATE AND ACETAMINOPHEN 5; 325 MG/1; MG/1
1 TABLET ORAL EVERY 6 HOURS PRN
Status: DISCONTINUED | OUTPATIENT
Start: 2020-02-27 | End: 2020-02-27 | Stop reason: HOSPADM

## 2020-02-27 RX ORDER — BUPIVACAINE HYDROCHLORIDE 2.5 MG/ML
INJECTION, SOLUTION INFILTRATION; PERINEURAL PRN
Status: DISCONTINUED | OUTPATIENT
Start: 2020-02-27 | End: 2020-02-27 | Stop reason: HOSPADM

## 2020-02-27 RX ADMIN — ROCURONIUM BROMIDE 30 MG: 10 INJECTION INTRAVENOUS at 08:19

## 2020-02-27 RX ADMIN — SODIUM CHLORIDE, POTASSIUM CHLORIDE, SODIUM LACTATE AND CALCIUM CHLORIDE: 600; 310; 30; 20 INJECTION, SOLUTION INTRAVENOUS at 07:15

## 2020-02-27 RX ADMIN — GLYCOPYRROLATE 0.2 MG: 0.2 INJECTION, SOLUTION INTRAMUSCULAR; INTRAVENOUS at 08:26

## 2020-02-27 RX ADMIN — DEXAMETHASONE SODIUM PHOSPHATE 6 MG: 10 INJECTION, SOLUTION INTRAMUSCULAR; INTRAVENOUS at 08:10

## 2020-02-27 RX ADMIN — GLYCOPYRROLATE 0.6 MG: 0.2 INJECTION, SOLUTION INTRAMUSCULAR; INTRAVENOUS at 08:52

## 2020-02-27 RX ADMIN — ONDANSETRON 4 MG: 2 INJECTION INTRAMUSCULAR; INTRAVENOUS at 08:10

## 2020-02-27 RX ADMIN — FENTANYL CITRATE 50 MCG: 50 INJECTION, SOLUTION INTRAMUSCULAR; INTRAVENOUS at 08:03

## 2020-02-27 RX ADMIN — PROPOFOL 300 MG: 10 INJECTION, EMULSION INTRAVENOUS at 08:03

## 2020-02-27 RX ADMIN — FENTANYL CITRATE 50 MCG: 50 INJECTION, SOLUTION INTRAMUSCULAR; INTRAVENOUS at 07:57

## 2020-02-27 RX ADMIN — Medication 120 MG: at 08:03

## 2020-02-27 RX ADMIN — CEFOTETAN AND DEXTROSE 2 G: 2 INJECTION, SOLUTION INTRAVENOUS at 08:10

## 2020-02-27 RX ADMIN — SCOPALAMINE 1 PATCH: 1 PATCH, EXTENDED RELEASE TRANSDERMAL at 07:48

## 2020-02-27 RX ADMIN — Medication 4 MG: at 08:52

## 2020-02-27 RX ADMIN — LIDOCAINE HYDROCHLORIDE 40 MG: 20 INJECTION, SOLUTION INFILTRATION; PERINEURAL at 08:03

## 2020-02-27 RX ADMIN — MIDAZOLAM 2 MG: 1 INJECTION INTRAMUSCULAR; INTRAVENOUS at 07:57

## 2020-02-27 RX ADMIN — ROCURONIUM BROMIDE 5 MG: 10 INJECTION INTRAVENOUS at 08:05

## 2020-02-27 RX ADMIN — FENTANYL CITRATE 50 MCG: 50 INJECTION, SOLUTION INTRAMUSCULAR; INTRAVENOUS at 09:39

## 2020-02-27 RX ADMIN — HYDROCODONE BITARTRATE AND ACETAMINOPHEN 1 TABLET: 5; 325 TABLET ORAL at 10:20

## 2020-02-27 ASSESSMENT — MIFFLIN-ST. JEOR: SCORE: 1811.82

## 2020-02-27 NOTE — OP NOTE
REPORT OF OPERATION  DATE OF PROCEDURE: 2/27/2020    PATIENT: Odalys Ingram    SURGERY PREFORMED: Laparoscopic Ventral Hernia Repair    PREOPERATIVE DIAGNOSIS: Ventral Hernia, without incarcernation    POSTOPERATIVE DIAGNOSIS: Ventral Hernia, with incarceration, without strangulation    SURGEON: Marvin Pedro MD    ASSISTANTS: None    ANESTHESIA: General Endotracheal Anesthesia    COMPLICATIONS: None apparent    TRANSFUSIONS: None    TISSUE TO PATHOLOGY:  None    FINDINGS: Ventral Hernia, with incarceration, without strangulation     INDICATIONS:  This is a 69 year old female with an ventral hemia.  The hernia is not tender, does  show evidence of incarceration and does  show evidence of strangulation.  The patient will be taken to the operating suite for a laparoscopic possible open ventral herniorrhaphy.    DESCRIPTIONS OF PROCEDURE IN DETAIL: After consent was obtained the patient was taken to the operative suite and samantha in the supine position.  The patient was identified and the correct patient was confirmed.  General endotracheal anesthesia was induced by anesthesia.  The patient was sterilely prepped and draped in the usual fashion.  A time out was preformed verifying the correct patient and the correct procedure.  The entire operative team was in agreement.  All necessary equipment and supplies were in the room.    Through a left upper quadrant incision, the skin was sharply entered and using a 5mm VisaPort, entrance into the abdomen was accomplished and pneumoperitoneum was obtained.  The laparoscope was inserted through the trocar and verification of no intraabdominal trauma was made.  Under direct visualization a 10mm left lower quadrant torcar was placed with verification of no intraabdominal trauma.  Additional 5mm trocars were placed as needed.  The defect was visualized and omentum and/or bowel was found to be incarcerated.  All hernia contents were reduced back into the abdomen.  A 12cm x 12cm  of Ventralight mesh was inserted into the abdomen and fixeated to the abdominal wall using the Ligastrap fixation device.  Trans-fascial sutures  were  utilized.  At the completion of the operation the defect was well covered.  Hemostasis was assured,  All instrumentation was removed.  Sponge, needle and instrument counts were correct.  The left lower quadrants incision's fascia was closed with a simple 0 Vicryl suture.  All skin incisions were closed with subcuticular 4-0 Monocryl.  Sterile dressings were applied.  The patient was awakened in the operative suite and extubated without difficulty and taken to the recovery room in stable condition tolerating the procedure well

## 2020-02-27 NOTE — DISCHARGE INSTRUCTIONS
No heavy lifting/more than 10 pounds for 6 weeks  May Shower tomorrow      Post-Anesthesia Patient Instructions    IMMEDIATELY FOLLOWING SURGERY:  Do not drive or operate machinery for the first twenty four hours after surgery.  Do not make any important decisions for twenty four hours after surgery or while taking narcotic pain medications or sedatives.  If you develop intractable nausea and vomiting or a severe headache please notify your doctor immediately.    FOLLOW-UP:  Please make an appointment with your surgeon as instructed. You do not need to follow up with anesthesia unless specifically instructed to do so.    WOUND CARE INSTRUCTIONS (if applicable):  Keep a dry clean dressing on the anesthesia/puncture wound site if there is drainage.  Once the wound has quit draining you may leave it open to air.  Generally you should leave the bandage intact for twenty four hours unless there is drainage.  If the epidural site drains for more than 36-48 hours please call the anesthesia department.    QUESTIONS?:  Please feel free to call your physician or the hospital  if you have any questions, and they will be happy to assist you.       Remove the scopolamine patch behind your left ear after 24 hours after application.   After removing the patch, wash your hands and the area behind your ear thoroughly with soap and water.   The patch will still contain some medicine after use.   To avoid accidental contact or ingestion by children or pets, fold the used patch in half with the sticky side together and throw away in the trash out of the reach of children and pets.

## 2020-02-27 NOTE — OR NURSING
Pt remains very restless with arms, constantly grabbing at side rails, without purpose, pt states she normally has high anxiety and is restless at home.

## 2020-02-27 NOTE — ANESTHESIA CARE TRANSFER NOTE
Patient: Odalys Ingram    Procedure(s):  LAPAROSCOPIC VENTRAL HERNIA REPAIR    Diagnosis: Incisional hernia, without obstruction or gangrene [K43.2]  Diagnosis Additional Information: No value filed.    Anesthesia Type:   General, ETT     Note:  Airway :Face Mask  Patient transferred to:PACU  Handoff Report: Identifed the Patient, Identified the Reponsible Provider, Reviewed the pertinent medical history, Discussed the surgical course, Reviewed Intra-OP anesthesia mangement and issues during anesthesia, Set expectations for post-procedure period and Allowed opportunity for questions and acknowledgement of understanding      Vitals: (Last set prior to Anesthesia Care Transfer)    CRNA VITALS  2/27/2020 0840 - 2/27/2020 0918      2/27/2020             Resp Rate (set):  8                Electronically Signed By: LAURITA Urbina CRNA  February 27, 2020  9:18 AM

## 2020-02-27 NOTE — OR NURSING
Pt into PACU, pt awake but restless, slow to follow commands, now more awake, but still constantly moving arms, states she feels restless.  Pt states pain 2/10.

## 2020-02-27 NOTE — H&P
"HISTORY AND PHYSICAL  2/27/2020    Patient : Odalys Ingram    Planned Procedures : Laparoscopic  ventral hernia repair    History of Present Illness:   This is a 69 year old female with a ventral hernia.  Patient will be taken the operating room for a laparoscopic possible open ventral herniorrhaphy.    Past Medical History:  Past Medical History:   Diagnosis Date     Anticipated difficulty with intubation 01/09/2020     Arthritis      Hypertension      PONV (postoperative nausea and vomiting)        Past Surgical History:  Past Surgical History:   Procedure Laterality Date     BREAST SURGERY      Mastectomy     BREAST SURGERY      Mastectomy     HERNIA REPAIR       HYSTERECTOMY       LAPAROSCOPIC HERNIORRHAPHY VENTRAL N/A 1/9/2020    Procedure: LAPAROSCOPIC POSSIBLE OPEN VENTRAL HERNIA REPAIR;  Surgeon: Marvin Pedro MD;  Location: [facility]       Family History History:  Family History   Problem Relation Age of Onset     Breast Cancer Mother      Hypertension Mother      Lipids Mother      Hypertension Father      Lipids Father      Heart Disease Maternal Grandmother         Disease     Breast Cancer Paternal Grandmother      Cancer Paternal Grandmother         Leukemia       History of Tobacco Use:  History   Smoking Status     Never Smoker   Smokeless Tobacco     Never Used       Current Medications:  No current outpatient medications on file.       Allergies:  No Known Allergies    ROS:  Pertinent items are noted in HPI.    PHYSICAL EXAM:     Vital signs: BP (!) 163/101   Temp 99.2  F (37.3  C) (Oral)   Resp 18   Ht 1.727 m (5' 8\")   Wt 123.8 kg (273 lb)   SpO2 96%   BMI 41.51 kg/m     Weight: [unfilled]   BMI: Body mass index is 41.51 kg/m .   General: Normal, healthy, cooperative, in no acute distress   Skin: no jaundice   HEENT: PERRLA and EOMI   Neck: supple   Lungs: clear to auscultation   CV: Regular rate and rhythm without murmer   Abdominal: abdomen is soft without significant tenderness, " masses, organomegaly or guarding.  There is a reducible ventral hernia.  Nontender.   Extremities: No cyanosis, clubbing or edema noted bilaterally in Upper and Lower Extremities   Neurological: without deficit    Assessment/Plan   This is a 69 year old female with a ventral hernia.  Patient will be taken the operating room for a laparoscopic possible open ventral herniorrhaphy.        The risks, benefits, and alternatives to the planned procedure were fully discussed with the patient and/or the patient's representative(s). The risks of bleeding, infection, death, missing pathology, the need for additional procedures intra-operatively, the possible need for intra-operative consults, the possible need for transfusion therapy, cardiopulmonary compromise, the possible need for additional surgery for a complication were discussed with the patient and/or the patient's representative(s). The patient's and/or patient's representative(s) questions were addressed and answered. Informed consent was obtained from the patient and/or the patient's representative(s). The patient and/or the patient's representative(s) consent to proceed.

## 2020-02-27 NOTE — OR NURSING
Patient and responsible adult given discharge instructions with no questions regarding instructions. Alirio score 19. Pain level 3/10.  Discharged from unit via wheelchair. Patient discharged to home with  obed.

## 2020-02-27 NOTE — ANESTHESIA POSTPROCEDURE EVALUATION
Patient: Odalys Ingram    Procedure(s):  LAPAROSCOPIC VENTRAL HERNIA REPAIR    Diagnosis:Incisional hernia, without obstruction or gangrene [K43.2]  Diagnosis Additional Information: No value filed.    Anesthesia Type:  General, ETT    Note:  Anesthesia Post Evaluation    Patient location during evaluation: Bedside  Patient participation: Able to fully participate in evaluation  Level of consciousness: awake and alert  Pain management: adequate  Airway patency: patent  Cardiovascular status: acceptable  Respiratory status: acceptable  Hydration status: acceptable  PONV: none     Anesthetic complications: None          Last vitals:  Vitals:    02/27/20 0942 02/27/20 0945 02/27/20 1000   BP: 124/81 138/75 134/84   Pulse: 72 71 73   Resp: (!) 11 17 16   Temp:  97  F (36.1  C)    SpO2: 94% 94% 96%         Electronically Signed By: LAURITA Pacheco CRNA  February 27, 2020  10:36 AM

## 2020-03-16 ENCOUNTER — OFFICE VISIT (OUTPATIENT)
Dept: SURGERY | Facility: OTHER | Age: 70
End: 2020-03-16
Attending: PHYSICAL MEDICINE & REHABILITATION
Payer: COMMERCIAL

## 2020-03-16 VITALS
WEIGHT: 273 LBS | HEART RATE: 83 BPM | DIASTOLIC BLOOD PRESSURE: 72 MMHG | SYSTOLIC BLOOD PRESSURE: 128 MMHG | BODY MASS INDEX: 41.51 KG/M2 | OXYGEN SATURATION: 97 % | TEMPERATURE: 98.4 F

## 2020-03-16 DIAGNOSIS — K43.9 VENTRAL HERNIA WITHOUT OBSTRUCTION OR GANGRENE: Primary | ICD-10-CM

## 2020-03-16 PROCEDURE — G0463 HOSPITAL OUTPT CLINIC VISIT: HCPCS

## 2020-03-16 PROCEDURE — 99024 POSTOP FOLLOW-UP VISIT: CPT | Performed by: SURGERY

## 2020-03-16 ASSESSMENT — PAIN SCALES - GENERAL: PAINLEVEL: MODERATE PAIN (4)

## 2020-03-16 NOTE — NURSING NOTE
"Chief Complaint   Patient presents with     Surgical Followup     2 week follow up hernia        Initial /72 (BP Location: Right arm, Patient Position: Chair, Cuff Size: Adult Large)   Pulse 83   Temp 98.4  F (36.9  C) (Tympanic)   Wt 123.8 kg (273 lb)   SpO2 97%   BMI 41.51 kg/m   Estimated body mass index is 41.51 kg/m  as calculated from the following:    Height as of 2/27/20: 1.727 m (5' 8\").    Weight as of this encounter: 123.8 kg (273 lb).  Medication Reconciliation: complete  Destini Lewis LPN  "

## 2020-03-16 NOTE — PROGRESS NOTES
CLINIC NOTE - POST-OP SURGERY  3/16/2020    Patient:Odalys Ingram    Procedure: Ventral Hernia Repair (laparoscopic )    This is a 69 year old female who is 2 weeks s/p Ventral Hernia Repair (laparoscopic ).  The patient has no complaints today.     Current Medications:  Current Outpatient Medications   Medication Sig Dispense Refill     amitriptyline (ELAVIL) 50 MG tablet Take 50 mg by mouth At Bedtime. 1 tablet at night       ascorbic acid 500 MG TABS Take  by mouth. 1 tablet daily       aspirin 81 MG tablet Take 1 tablet by mouth daily. 1 tablet daily       cholecalciferol 1000 UNIT TABS Take  by mouth. Take 1 cap by mouth daily       lisinopril-hydrochlorothiazide (PRINZIDE,ZESTORETIC) 10-12.5 MG per tablet Take 1 tablet by mouth daily. 1 tablet daily       metoprolol (LOPRESSOR) 50 MG tablet Take 50 mg by mouth 2 times daily. 1 tablet daily       omega-3 fatty acids 1200 MG capsule Take 1 capsule by mouth daily. 1 daily         Allergies:  No Known Allergies    PHYSICAL EXAM:   Vital signs: /72 (BP Location: Right arm, Patient Position: Chair, Cuff Size: Adult Large)   Pulse 83   Temp 98.4  F (36.9  C) (Tympanic)   Wt 123.8 kg (273 lb)   SpO2 97%   BMI 41.51 kg/m     Weight: [unfilled]   BMI: Body mass index is 41.51 kg/m .   General: Normal, healthy, cooperative, morbidly obese   Lungs: clear to auscultation   CV: Regular rate and rhythm without murmer   Abdominal: abdomen is soft without significant tenderness, masses, organomegaly or guarding   Wounds:  Well healed surgical scars consistent with her operation.       PATHOLOGY:  No results found for: PATH    ASSESSMENT:    69 year old female who is 2 weeks s/p Ventral Hernia Repair (laparoscopic ).  Doing well.     PLAN:  Follow-up PRN      Restrictions : Will continue lifting restrictions of no more than 10 pounds until 6 weeks after surgery

## 2020-06-17 NOTE — INTERVAL H&P NOTE
Failed protocol due to    PHQ-9 score less than 5 in past 6 months Protocol Details    Recent (6 mo) or future (30 days) visit within the authorizing provider's specialty      PHQ-9 score:    PHQ 11/5/2018   PHQ-9 Total Score 0   Q9: Thoughts of better off dead/self-harm past 2 weeks Not at all               The History and Physical has been reviewed, the patient has been examined and no changes have occurred in the patient's condition since the H & P was completed.

## 2021-09-08 ENCOUNTER — OFFICE VISIT (OUTPATIENT)
Dept: FAMILY MEDICINE | Facility: OTHER | Age: 71
End: 2021-09-08
Payer: COMMERCIAL

## 2021-09-08 VITALS
SYSTOLIC BLOOD PRESSURE: 132 MMHG | DIASTOLIC BLOOD PRESSURE: 80 MMHG | TEMPERATURE: 98.1 F | HEART RATE: 79 BPM | RESPIRATION RATE: 16 BRPM | BODY MASS INDEX: 37.56 KG/M2 | OXYGEN SATURATION: 97 % | WEIGHT: 247 LBS

## 2021-09-08 DIAGNOSIS — L82.0 INFLAMED SEBORRHEIC KERATOSIS: Primary | ICD-10-CM

## 2021-09-08 DIAGNOSIS — E66.01 MORBID OBESITY (H): ICD-10-CM

## 2021-09-08 DIAGNOSIS — Z12.11 SPECIAL SCREENING FOR MALIGNANT NEOPLASMS, COLON: ICD-10-CM

## 2021-09-08 PROCEDURE — 17110 DESTRUCTION B9 LES UP TO 14: CPT | Performed by: FAMILY MEDICINE

## 2021-09-08 PROCEDURE — 99212 OFFICE O/P EST SF 10 MIN: CPT | Mod: 25 | Performed by: FAMILY MEDICINE

## 2021-09-08 RX ORDER — CALCIUM CARBONATE/VITAMIN D3 500-10/5ML
LIQUID (ML) ORAL
COMMUNITY

## 2021-09-08 RX ORDER — ZINC GLUCONATE 50 MG
50 TABLET ORAL DAILY
COMMUNITY

## 2021-09-08 NOTE — PROGRESS NOTES
Assessment & Plan       ICD-10-CM    1. Inflamed seborrheic keratosis  L82.0 DESTRUCT BENIGN LESION, UP TO 14   2. Special screening for malignant neoplasms, colon  Z12.11 Fecal colorectal cancer screen (FIT)   3. Morbid obesity (H)  E66.01       1.  Discussed the nature of these and options for treatment.  Patient was to proceed with cryotherapy.  Lesions were frozen with 2 cycles of liquid nitrogen with good tolerance of procedure.  Questions were answered and instructions given.  2.  Stool screen ordered.  3.  Encouraged patient on her progress in managing this.  We will continue to follow and assist with this as needed.    Portions of this note were completed using Dragon dictation software.  Although reviewed, there may be typographical and other inadvertent errors that remain.       Ordering of each unique test         Patient Instructions   Thank you for visiting [facility]    These spots may blister up.  Let me know if problems.    They should then fall off in about 1-2 weeks.    If the spots come back in the same place, let me know.  We should remove them at that point.      Get your colon screening done.    I would recommend tetanus and flu shots in the next month or two.      Contact us or return if questions or concerns.     Have a nice day!    Dr. Joseph     No follow-ups on file.      If you need medication refills, please contact your pharmacy 3 days before your prescriptions runs out or download the [facility] yifan for your smart phone. If you are out of refills, your pharmacy will contact contact the clinic.                                     [employer] Assistance [phone]                       Return if symptoms worsen or fail to improve.    Dheeraj Joseph MD, MD  [facility] OSBALDO Morrow is a 71 year old who presents for the following health issues  accompanied by her daughter:    HPI     Skin Lesion  Onset/Duration: 2 weeks  ago  Description  Location: upper to mid back and a couple on arms  Color: brown  Border description: raised  Character: blotchy, raised  Itching: mild  Bleeding:  no  Intensity:  mild  Progression of Symptoms:  same  Accompanying signs and symptoms:   Bleeding: no  Scaling: no  Excessive sun exposure/tanning: no  Sunscreen used: not applicable  History:           Any previous history of skin cancer: no  Any family history of melanoma: no  Previous episodes of similar lesion: no  Precipitating or alleviating factors: none  Therapies tried and outcome: none    Has multiple spots c/w SKs, but the lesion on her back and left wrist are getting irritated and growing.      Wt Readings from Last 5 Encounters:   09/08/21 112 kg (247 lb)   03/16/20 123.8 kg (273 lb)   02/27/20 123.8 kg (273 lb)   02/10/20 123.8 kg (273 lb)   12/10/19 123.8 kg (273 lb)       Review of Systems   Constitutional, HEENT, cardiovascular, pulmonary, gi and gu systems are negative, except as otherwise noted.      Objective    /80 (BP Location: Right arm, Patient Position: Sitting, Cuff Size: Adult Large)   Pulse 79   Temp 98.1  F (36.7  C) (Temporal)   Resp 16   Wt 112 kg (247 lb)   SpO2 97%   BMI 37.56 kg/m    Body mass index is 37.56 kg/m .  Physical Exam   GENERAL: healthy, alert and no distress  SKIN: no suspicious lesions or rashes and keratoses - seborrheic # 5    No results found for any visits on 09/08/21.

## 2021-09-08 NOTE — PATIENT INSTRUCTIONS
Thank you for visiting [facility]    These spots may blister up.  Let me know if problems.    They should then fall off in about 1-2 weeks.    If the spots come back in the same place, let me know.  We should remove them at that point.      Get your colon screening done.    I would recommend tetanus and flu shots in the next month or two.      Contact us or return if questions or concerns.     Have a nice day!    Dr. Joseph     No follow-ups on file.      If you need medication refills, please contact your pharmacy 3 days before your prescriptions runs out or download the [facility] yifan for your smart phone. If you are out of refills, your pharmacy will contact contact the clinic.                                     MyChart Assistance [phone]

## 2021-09-13 ENCOUNTER — LAB (OUTPATIENT)
Dept: LAB | Facility: CLINIC | Age: 71
End: 2021-09-13
Payer: COMMERCIAL

## 2021-09-13 DIAGNOSIS — Z12.11 SPECIAL SCREENING FOR MALIGNANT NEOPLASMS, COLON: ICD-10-CM

## 2021-09-13 PROCEDURE — 82274 ASSAY TEST FOR BLOOD FECAL: CPT

## 2021-09-15 LAB — HEMOCCULT STL QL IA: NEGATIVE

## 2021-11-03 ENCOUNTER — TELEPHONE (OUTPATIENT)
Dept: FAMILY MEDICINE | Facility: OTHER | Age: 71
End: 2021-11-03

## 2021-11-03 NOTE — TELEPHONE ENCOUNTER
Patient Quality Outreach Summary      Summary:    Patient is due/failing the following:   Medicare annual wellness    Type of outreach:    Phone, spoke to patient/parent. patient is non [place] pat.     Questions for provider review:    None                                                                                                                    Yadira Restrepo CMA       Chart routed to Care Team.

## (undated) DEVICE — BLANKET-BAIR UPPER BODY

## (undated) DEVICE — CAUTERY PAD-POLYHESIVE II ADULT

## (undated) DEVICE — IRRIGATION-NACL 1000ML

## (undated) DEVICE — CAUTERY-LAP L-HOOK

## (undated) DEVICE — SUTURE-MONOCRYL 4-0 PS-2 Y496G

## (undated) DEVICE — CATH TRAY-16FR METER W/STATLOCK LATEX]

## (undated) DEVICE — PUNCTURE CLOSURE DEVICE

## (undated) DEVICE — CLEARIFY VISUALIZATION SYSTEM (SCOPE WARMER)

## (undated) DEVICE — SUTURE-PROLENE 2-0 SH 8833H

## (undated) DEVICE — TUBE-SALEM SUMP 18FR STOMACH SUCTION

## (undated) DEVICE — CANISTER-SUCTION 2000CC

## (undated) DEVICE — WANDS-INSULSCAN

## (undated) DEVICE — GLV-7.5 PROTEXIS PI CLASSIC LF/PF

## (undated) DEVICE — TROCAR SLEEVE-KII 5X100MM

## (undated) DEVICE — LABEL-STERILE PREPRINTED FOR OR

## (undated) DEVICE — SUTURE-PROLENE 0 CT-1 8424H

## (undated) DEVICE — DRAPE-IOBAN 2 35CM X 35CM

## (undated) DEVICE — LIGHT HANDLE COVER

## (undated) DEVICE — TROCAR-5X100MM FIOS BLADELESS

## (undated) DEVICE — TUBING-SEECLEAR LAPAROSCOPIC SMOKE EVACUATION

## (undated) DEVICE — IRRIGATION-H2O 1000ML

## (undated) DEVICE — SCD SLEEVE-KNEE REG.

## (undated) DEVICE — PACK-LAPAROSCOPY-CUSTOM

## (undated) DEVICE — APPLICATOR-CHLORAPREP 26ML TINTED CHG 2%+ 70% IPA-SURGICAL

## (undated) DEVICE — CORD-LAPAROSCOPIC MONOPOLAR-DISPOSABLE

## (undated) DEVICE — BIN-COVIDIEN MESH BIN

## (undated) DEVICE — TROCAR-12X100MM KII FIOS

## (undated) DEVICE — SUTURE-VICRYL 0 UR-6 J603H

## (undated) DEVICE — INZII RETRIEVAL SYSTEM-10MM

## (undated) DEVICE — KUMAR T-ANCHORS

## (undated) RX ORDER — GLYCOPYRROLATE 0.2 MG/ML
INJECTION, SOLUTION INTRAMUSCULAR; INTRAVENOUS
Status: DISPENSED
Start: 2020-02-27

## (undated) RX ORDER — ONDANSETRON 2 MG/ML
INJECTION INTRAMUSCULAR; INTRAVENOUS
Status: DISPENSED
Start: 2020-02-27

## (undated) RX ORDER — PROPOFOL 10 MG/ML
INJECTION, EMULSION INTRAVENOUS
Status: DISPENSED
Start: 2020-02-27

## (undated) RX ORDER — FENTANYL CITRATE 50 UG/ML
INJECTION, SOLUTION INTRAMUSCULAR; INTRAVENOUS
Status: DISPENSED
Start: 2020-02-27

## (undated) RX ORDER — DEXAMETHASONE SODIUM PHOSPHATE 10 MG/ML
INJECTION, SOLUTION INTRAMUSCULAR; INTRAVENOUS
Status: DISPENSED
Start: 2020-02-27

## (undated) RX ORDER — LIDOCAINE HYDROCHLORIDE 20 MG/ML
INJECTION, SOLUTION EPIDURAL; INFILTRATION; INTRACAUDAL; PERINEURAL
Status: DISPENSED
Start: 2020-02-27

## (undated) RX ORDER — NEOSTIGMINE METHYLSULFATE 1 MG/ML
VIAL (ML) INJECTION
Status: DISPENSED
Start: 2020-02-27

## (undated) RX ORDER — PROPOFOL 10 MG/ML
INJECTION, EMULSION INTRAVENOUS
Status: DISPENSED
Start: 2020-01-09

## (undated) RX ORDER — FENTANYL CITRATE 50 UG/ML
INJECTION, SOLUTION INTRAMUSCULAR; INTRAVENOUS
Status: DISPENSED
Start: 2020-01-09

## (undated) RX ORDER — KETAMINE HCL IN NACL, ISO-OSM 100MG/10ML
SYRINGE (ML) INJECTION
Status: DISPENSED
Start: 2020-01-09

## (undated) RX ORDER — ONDANSETRON 2 MG/ML
INJECTION INTRAMUSCULAR; INTRAVENOUS
Status: DISPENSED
Start: 2020-01-09

## (undated) RX ORDER — LIDOCAINE HYDROCHLORIDE 20 MG/ML
INJECTION, SOLUTION EPIDURAL; INFILTRATION; INTRACAUDAL; PERINEURAL
Status: DISPENSED
Start: 2020-01-09

## (undated) RX ORDER — DEXAMETHASONE SODIUM PHOSPHATE 10 MG/ML
INJECTION, SOLUTION INTRAMUSCULAR; INTRAVENOUS
Status: DISPENSED
Start: 2020-01-09